# Patient Record
Sex: FEMALE | Race: OTHER | HISPANIC OR LATINO | Employment: UNEMPLOYED | URBAN - METROPOLITAN AREA
[De-identification: names, ages, dates, MRNs, and addresses within clinical notes are randomized per-mention and may not be internally consistent; named-entity substitution may affect disease eponyms.]

---

## 2020-09-10 ENCOUNTER — OFFICE VISIT (OUTPATIENT)
Dept: URGENT CARE | Facility: PHYSICIAN GROUP | Age: 35
End: 2020-09-10

## 2020-09-10 ENCOUNTER — HOSPITAL ENCOUNTER (OUTPATIENT)
Facility: MEDICAL CENTER | Age: 35
End: 2020-09-10
Attending: PHYSICIAN ASSISTANT
Payer: COMMERCIAL

## 2020-09-10 VITALS
HEART RATE: 82 BPM | HEIGHT: 64 IN | SYSTOLIC BLOOD PRESSURE: 130 MMHG | TEMPERATURE: 98.6 F | BODY MASS INDEX: 26.29 KG/M2 | DIASTOLIC BLOOD PRESSURE: 80 MMHG | WEIGHT: 154 LBS | RESPIRATION RATE: 18 BRPM | OXYGEN SATURATION: 98 %

## 2020-09-10 DIAGNOSIS — N30.01 ACUTE CYSTITIS WITH HEMATURIA: Primary | ICD-10-CM

## 2020-09-10 DIAGNOSIS — N89.8 VAGINAL IRRITATION: ICD-10-CM

## 2020-09-10 DIAGNOSIS — Z87.42 HISTORY OF ABNORMAL CERVICAL PAP SMEAR: ICD-10-CM

## 2020-09-10 LAB
APPEARANCE UR: CLEAR
BILIRUB UR STRIP-MCNC: NORMAL MG/DL
COLOR UR AUTO: YELLOW
GLUCOSE UR STRIP.AUTO-MCNC: NORMAL MG/DL
KETONES UR STRIP.AUTO-MCNC: NORMAL MG/DL
LEUKOCYTE ESTERASE UR QL STRIP.AUTO: NORMAL
NITRITE UR QL STRIP.AUTO: POSITIVE
PH UR STRIP.AUTO: 5 [PH] (ref 5–8)
PROT UR QL STRIP: NORMAL MG/DL
RBC UR QL AUTO: NORMAL
SP GR UR STRIP.AUTO: 1.02
UROBILINOGEN UR STRIP-MCNC: 0.2 MG/DL

## 2020-09-10 PROCEDURE — 87186 SC STD MICRODIL/AGAR DIL: CPT

## 2020-09-10 PROCEDURE — 81002 URINALYSIS NONAUTO W/O SCOPE: CPT | Performed by: PHYSICIAN ASSISTANT

## 2020-09-10 PROCEDURE — 87077 CULTURE AEROBIC IDENTIFY: CPT

## 2020-09-10 PROCEDURE — 99203 OFFICE O/P NEW LOW 30 MIN: CPT | Performed by: PHYSICIAN ASSISTANT

## 2020-09-10 PROCEDURE — 87086 URINE CULTURE/COLONY COUNT: CPT

## 2020-09-10 RX ORDER — CEFDINIR 300 MG/1
300 CAPSULE ORAL EVERY 12 HOURS
Qty: 10 CAP | Refills: 0 | Status: SHIPPED | OUTPATIENT
Start: 2020-09-10 | End: 2020-09-15

## 2020-09-10 ASSESSMENT — ENCOUNTER SYMPTOMS
CHILLS: 0
SHORTNESS OF BREATH: 0
FEVER: 0
NAUSEA: 0
VOMITING: 0
FLANK PAIN: 0
CONSTIPATION: 0
ABDOMINAL PAIN: 0
DIARRHEA: 0
COUGH: 0

## 2020-09-10 ASSESSMENT — PAIN SCALES - GENERAL: PAINLEVEL: 8=MODERATE-SEVERE PAIN

## 2020-09-11 DIAGNOSIS — N30.01 ACUTE CYSTITIS WITH HEMATURIA: ICD-10-CM

## 2020-09-11 NOTE — PATIENT INSTRUCTIONS
[facility]   Address: [address]  [place], [place] - [address]  Phone:  [phone]          Infección urinaria en los adultos  Urinary Tract Infection, Adult  Inés infección urinaria (IU) puede ocurrir en cualquier lugar de las vías urinarias. Las vías urinarias incluyen lo siguiente:  · Los riñones.  · Los uréteres.  · La vejiga.  · La uretra.  Estos órganos fabrican, almacenan y eliminan el pis (orina) del cuerpo.  ¿Cuáles son las causas?  La causa es la presencia de gérmenes (bacterias) en la mary grace genital. Estos gérmenes proliferan y causan hinchazón (inflamación) de las vías urinarias.  ¿Qué incrementa el riesgo?  Es más probable que contraiga esta afección si:  · Tiene colocado un tubo guzman y pequeño (catéter) para drenar el pis.  · No puede controlar la evacuación de pis ni de materia fecal (incontinencia).  · Es mateo y, además:  ? Usa estos métodos para evitar el embarazo:  ? Un medicamento que leger los espermatozoides (espermicida).  ? Un dispositivo que impide el paso de los espermatozoides (diafragma).  ? Tiene niveles bajos de inés hormona femenina (estrógeno).  ? Está embarazada.  · Tiene genes que aumentan morocho riesgo.  · Es sexualmente activa.  · Sandie antibióticos.  · Tiene dificultad para orinar debido a:  ? Morocho próstata es más shin de lo normal, si usted es hombre.  ? Obstrucción en la parte del cuerpo que drena el pis de la vejiga (uretra).  ? Cálculo renal.  ? Un trastorno nervioso que afecta la vejiga (vejiga neurógena).  ? No micheal inés cantidad suficiente de líquido.  ? No hace pis con la frecuencia suficiente.  · Tiene otras afecciones, mirza:  ? Diabetes.  ? Un sistema que combate las enfermedades (sistema inmunitario) debilitado.  ? Anemia drepanocítica.  ? Gota.  ? Lesión en la columna vertebral.  ¿Cuáles son los signos o los síntomas?  Los síntomas de esta afección incluyen:  · Necesidad inmediata (urgente) de hacer pis.  · Hacer pis con frecuencia.  · Hacer poca cantidad de pis con mucha  frecuencia.  · Dolor o ardor al hacer pis.  · Carson en el pis.  · Pis que huele mal o anormal.  · Dificultad para hacer pis.  · Pis turbio.  · Líquido que sale de la vagina, si es mateo.  · Dolor en la danny o en la parte baja de la espalda.  Otros síntomas pueden incluir los siguientes:  · Vómitos.  · No sentir deseos de comer.  · Sentirse confundido (confuso).  · Sentirse cansado y malhumorado (irritable).  · Fiebre.  · Materia fecal líquida (diarrea).  ¿Cómo se trata?  El tratamiento de esta afección puede incluir:  · Antibióticos.  · Otros medicamentos.  · Beber inés cantidad suficiente de agua.  Sigue estas instrucciones en tu casa:    Medicamentos  · [place] los medicamentos de venta tracee y los recetados solamente mirza se lo haya indicado el médico.  · Si le recetaron un antibiótico, tómelo mirza se lo haya indicado el médico. No deje de tomarlo aunque comience a sentirse mejor.  Indicaciones generales  · Asegúrese de hacer lo siguiente:  ? Linda pis hasta que la vejiga quede vacía.  ? No contenga el pis maría mucho tiempo.  ? Vacíe la vejiga después de tener relaciones sexuales.  ? Límpiese de adelante hacia atrás después de defecar, si es mateo. Use cada trozo de papel higiénico solo inés vez cuando se limpie.  · Salma suficiente líquido mirza para mantener la orina de color amarillo pálido.  · Concurra a todas las visitas de seguimiento mirza se lo haya indicado el médico. [place] es importante.  Comuníquese con un médico si:  · No mejora después de 1 o 2 días de tratamiento.  · Los síntomas desaparecen y luego reaparecen.  Solicite ayuda inmediatamente si:  · Tiene un dolor muy intenso en la espalda.  · Tiene dolor muy intenso en la parte baja de la danny.  · Tener fiebre.  · Tiene malestar estomacal (náuseas).  · Tiene vómitos.  Resumen  · Inés infección urinaria (IU) puede ocurrir en cualquier lugar de las vías urinarias.  · Esta afección es causada por la presencia de gérmenes en la mary grace genital.  · Existen  muchos factores de riesgo de sufrir benitez IU. Estos incluyen tener colocado un tubo guzman y pequeño para drenar el pis y no poder controlar cuándo hace pis y materia fecal.  · El tratamiento incluye antibióticos contra los gérmenes.  · Salma suficiente líquido mirza para mantener la orina de color amarillo pálido.  Esta información no tiene mirza fin reemplazar el consejo del médico. Asegúrese de hacerle al médico cualquier pregunta que tenga.  Document Released: 06/07/2011 Document Revised: 12/11/2019 Document Reviewed: 12/11/2019  Elsevier Patient Education © 2020 [employer]

## 2020-09-11 NOTE — PROGRESS NOTES
"Subjective:   Mayte Landin is a 35 y.o. female who presents for Dysuria (x1 week)        Dysuria   This is a new problem. Episode onset: 1 week  The problem has been unchanged. The quality of the pain is described as burning. There has been no fever. There is a history of pyelonephritis. Associated symptoms include frequency, hesitancy and urgency. Pertinent negatives include no chills, flank pain, hematuria, nausea or vomiting. Treatments tried: azo  Her past medical history is significant for recurrent UTIs. There is no history of kidney stones.         Review of Systems   Constitutional: Negative for chills, fever and malaise/fatigue.   Respiratory: Negative for cough and shortness of breath.    Gastrointestinal: Negative for abdominal pain, constipation, diarrhea, nausea and vomiting.   Genitourinary: Positive for dysuria, frequency, hesitancy and urgency. Negative for flank pain and hematuria.   All other systems reviewed and are negative.      PMH:  has a past medical history of Kidney disease.  MEDS:   Current Outpatient Medications:   •  AZO-CRANBERRY PO, Take  by mouth., Disp: , Rfl:   •  cefdinir (OMNICEF) 300 MG Cap, Take 1 Cap by mouth every 12 hours for 5 days., Disp: 10 Cap, Rfl: 0  ALLERGIES: No Known Allergies  SURGHX: History reviewed. No pertinent surgical history.  SOCHX:  reports that she has never smoked. She has never used smokeless tobacco. She reports that she does not drink alcohol or use drugs.  History reviewed. No pertinent family history.     Objective:   /80   Pulse 82   Temp 37 °C (98.6 °F) (Temporal)   Resp 18   Ht 1.626 m (5' 4\")   Wt 69.9 kg (154 lb)   SpO2 98%   BMI 26.43 kg/m²     Physical Exam  Vitals signs reviewed.   Constitutional:       General: She is not in acute distress.     Appearance: She is well-developed.   HENT:      Head: Normocephalic and atraumatic.      Right Ear: External ear normal.      Left Ear: External ear normal.      Nose: Nose " normal.      Mouth/Throat:      Mouth: Mucous membranes are moist.   Eyes:      Conjunctiva/sclera: Conjunctivae normal.      Pupils: Pupils are equal, round, and reactive to light.   Neck:      Musculoskeletal: Normal range of motion and neck supple.      Trachea: No tracheal deviation.   Cardiovascular:      Rate and Rhythm: Normal rate and regular rhythm.   Pulmonary:      Effort: Pulmonary effort is normal.      Breath sounds: Normal breath sounds.   Abdominal:      General: There is no distension.      Tenderness: There is no abdominal tenderness. There is no right CVA tenderness or left CVA tenderness.   Skin:     General: Skin is warm and dry.      Capillary Refill: Capillary refill takes less than 2 seconds.   Neurological:      General: No focal deficit present.      Mental Status: She is alert and oriented to person, place, and time.   Psychiatric:         Mood and Affect: Mood normal.         Behavior: Behavior normal.       Lab Results   Component Value Date/Time    POCCOLOR yellow 09/10/2020 07:02 PM    POCAPPEAR clear 09/10/2020 07:02 PM    POCLEUKEST moderate 09/10/2020 07:02 PM    POCNITRITE positive 09/10/2020 07:02 PM    POCUROBILIGE 0.2 09/10/2020 07:02 PM    POCPROTEIN neg 09/10/2020 07:02 PM    POCURPH 5.0 09/10/2020 07:02 PM    POCBLOOD moderate 09/10/2020 07:02 PM    POCSPGRV 1.020 09/10/2020 07:02 PM    POCKETONES neg 09/10/2020 07:02 PM    POCBILIRUBIN neg 09/10/2020 07:02 PM    POCGLUCUA neg 09/10/2020 07:02 PM            Assessment/Plan:     1. Acute cystitis with hematuria  POCT Urinalysis    Urine Culture    cefdinir (OMNICEF) 300 MG Cap   2. Vaginal irritation  CANCELED: VAGINAL PATHOGENS DNA PANEL    CANCELED: Chlamydia/GC PCR Urine Or Swab   3. History of abnormal cervical Pap smear  REFERRAL TO GYNECOLOGY     Hx and PE completed with daughter interpreting. The pt declines use of professional interpreting service.    The pt is currently uninsured and cost is an issue we will hold off  on vaginal pathogens and gc/chlamydia swabs for now. She will follow up with [facility] clinic in [place]. She was given address and phone number for clinic.     She will be treated empirically with cedinir for uti sx. If her sx persist she can contact me and I will place outpatient lab collect orders for vaginal pathogens and gonorrhea/chlamydia testing. She will be notified of final urine culture results. UTI handout was provided.     If symptoms worsen or persist patient can return to clinic for reevaluation.  Red flags and emergency room precautions discussed. All side effects of medication discussed including allergic response, GI upset, tendon injury, etc. Patient confirmed understanding of information.    Please note that this dictation was created using voice recognition software. I have made every reasonable attempt to correct obvious errors, but I expect that there are errors of grammar and possibly content that I did not discover before finalizing the note.

## 2020-09-13 LAB
BACTERIA UR CULT: ABNORMAL
BACTERIA UR CULT: ABNORMAL
SIGNIFICANT IND 70042: ABNORMAL
SITE SITE: ABNORMAL
SOURCE SOURCE: ABNORMAL

## 2022-12-31 ENCOUNTER — OFFICE VISIT (OUTPATIENT)
Dept: URGENT CARE | Facility: PHYSICIAN GROUP | Age: 37
End: 2022-12-31

## 2022-12-31 ENCOUNTER — HOSPITAL ENCOUNTER (OUTPATIENT)
Facility: MEDICAL CENTER | Age: 37
End: 2022-12-31
Attending: NURSE PRACTITIONER

## 2022-12-31 VITALS
RESPIRATION RATE: 16 BRPM | HEIGHT: 65 IN | WEIGHT: 150 LBS | TEMPERATURE: 98 F | DIASTOLIC BLOOD PRESSURE: 80 MMHG | HEART RATE: 88 BPM | SYSTOLIC BLOOD PRESSURE: 120 MMHG | OXYGEN SATURATION: 94 % | BODY MASS INDEX: 24.99 KG/M2

## 2022-12-31 DIAGNOSIS — R39.9 LOWER URINARY TRACT SYMPTOMS (LUTS): ICD-10-CM

## 2022-12-31 DIAGNOSIS — N30.01 ACUTE CYSTITIS WITH HEMATURIA: ICD-10-CM

## 2022-12-31 DIAGNOSIS — B00.1 COLD SORE: ICD-10-CM

## 2022-12-31 LAB
APPEARANCE UR: CLEAR
BILIRUB UR STRIP-MCNC: ABNORMAL MG/DL
COLOR UR AUTO: ABNORMAL
GLUCOSE UR STRIP.AUTO-MCNC: 100 MG/DL
KETONES UR STRIP.AUTO-MCNC: 15 MG/DL
LEUKOCYTE ESTERASE UR QL STRIP.AUTO: ABNORMAL
NITRITE UR QL STRIP.AUTO: POSITIVE
PH UR STRIP.AUTO: >=9 [PH] (ref 5–8)
PROT UR QL STRIP: >=300 MG/DL
RBC UR QL AUTO: ABNORMAL
SP GR UR STRIP.AUTO: 1.01
UROBILINOGEN UR STRIP-MCNC: 4 MG/DL

## 2022-12-31 PROCEDURE — 81002 URINALYSIS NONAUTO W/O SCOPE: CPT | Performed by: NURSE PRACTITIONER

## 2022-12-31 PROCEDURE — 99214 OFFICE O/P EST MOD 30 MIN: CPT | Performed by: NURSE PRACTITIONER

## 2022-12-31 PROCEDURE — 87186 SC STD MICRODIL/AGAR DIL: CPT

## 2022-12-31 PROCEDURE — 87077 CULTURE AEROBIC IDENTIFY: CPT

## 2022-12-31 PROCEDURE — 87086 URINE CULTURE/COLONY COUNT: CPT

## 2022-12-31 RX ORDER — PHENAZOPYRIDINE HYDROCHLORIDE 200 MG/1
200 TABLET, FILM COATED ORAL 3 TIMES DAILY PRN
Qty: 6 TABLET | Refills: 0 | Status: SHIPPED | OUTPATIENT
Start: 2022-12-31 | End: 2023-01-02

## 2022-12-31 RX ORDER — CEFDINIR 300 MG/1
300 CAPSULE ORAL EVERY 12 HOURS
Qty: 10 CAPSULE | Refills: 0 | Status: SHIPPED | OUTPATIENT
Start: 2022-12-31 | End: 2023-01-05

## 2022-12-31 RX ORDER — VALACYCLOVIR HYDROCHLORIDE 1 G/1
2000 TABLET, FILM COATED ORAL 2 TIMES DAILY
Qty: 4 TABLET | Refills: 0 | Status: SHIPPED | OUTPATIENT
Start: 2022-12-31 | End: 2023-01-01

## 2022-12-31 ASSESSMENT — ENCOUNTER SYMPTOMS
FEVER: 0
NAUSEA: 0
BACK PAIN: 0
ORTHOPNEA: 0
HEADACHES: 0
VOMITING: 0
CHILLS: 0
FLANK PAIN: 0
ABDOMINAL PAIN: 1
DIARRHEA: 0
DIZZINESS: 0

## 2022-12-31 NOTE — PROGRESS NOTES
Subjective     Mayte Landin is a 37 y.o. female who presents with UTI (X 1 day.)            HPI  New problem.  Patient is a 37-year-old female who presents with UTI x1 day.  She reports burning, frequency, urgency, and lower abdominal pain.  She denies fever, chills, nausea, or back pain.  She has been taking over-the-counter cranberry Azo for her symptoms.    Patient has no known allergies.  Current Outpatient Medications on File Prior to Visit   Medication Sig Dispense Refill    Probiotic Product (PROBIOTIC DAILY PO) Take  by mouth.      AZO-CRANBERRY PO Take  by mouth.       No current facility-administered medications on file prior to visit.     Social History     Socioeconomic History    Marital status:      Spouse name: Not on file    Number of children: Not on file    Years of education: Not on file    Highest education level: Not on file   Occupational History    Not on file   Tobacco Use    Smoking status: Never    Smokeless tobacco: Never   Substance and Sexual Activity    Alcohol use: No    Drug use: No    Sexual activity: Yes     Partners: Male     Comment: Planned pregnancy   Other Topics Concern    Not on file   Social History Narrative    Not on file     Social Determinants of Health     Financial Resource Strain: Not on file   Food Insecurity: Not on file   Transportation Needs: Not on file   Physical Activity: Not on file   Stress: Not on file   Social Connections: Not on file   Intimate Partner Violence: Not on file   Housing Stability: Not on file     Breast Cancer-related family history is not on file.      Review of Systems   Constitutional:  Negative for chills and fever.   Cardiovascular:  Negative for chest pain and orthopnea.   Gastrointestinal:  Positive for abdominal pain. Negative for diarrhea, nausea and vomiting.   Genitourinary:  Positive for dysuria, frequency and urgency. Negative for flank pain and hematuria.   Musculoskeletal:  Negative for back pain.  "  Neurological:  Negative for dizziness and headaches.            Objective     /80   Pulse 88   Temp 36.7 °C (98 °F) (Temporal)   Resp 16   Ht 1.651 m (5' 5\")   Wt 68 kg (150 lb)   SpO2 94%   BMI 24.96 kg/m²      Physical Exam  Vitals reviewed.   Constitutional:       General: She is not in acute distress.     Appearance: She is well-developed.   Cardiovascular:      Rate and Rhythm: Normal rate and regular rhythm.      Heart sounds: Normal heart sounds. No murmur heard.  Pulmonary:      Effort: Pulmonary effort is normal. No respiratory distress.      Breath sounds: Normal breath sounds.   Abdominal:      General: Bowel sounds are normal.      Palpations: Abdomen is soft.      Tenderness: There is abdominal tenderness in the suprapubic area. There is no right CVA tenderness or left CVA tenderness.   Musculoskeletal:         General: Normal range of motion.      Comments: Moves all 4 extremities normally   Skin:     General: Skin is warm and dry.      Comments: Cold sore upper lip x 3 days   Neurological:      Mental Status: She is alert and oriented to person, place, and time.   Psychiatric:         Behavior: Behavior normal.         Thought Content: Thought content normal.                           Assessment & Plan        1. Acute cystitis with hematuria  phenazopyridine (PYRIDIUM) 200 MG Tab    cefdinir (OMNICEF) 300 MG Cap      2. Lower urinary tract symptoms (LUTS)  POCT Urinalysis    URINE CULTURE(NEW)      3. Cold sore  valacyclovir (VALTREX) 1 GM Tab           Differential diagnosis, natural history, supportive care, and indications for immediate follow-up discussed at length.           "
